# Patient Record
Sex: FEMALE | Race: ASIAN
[De-identification: names, ages, dates, MRNs, and addresses within clinical notes are randomized per-mention and may not be internally consistent; named-entity substitution may affect disease eponyms.]

---

## 2021-10-27 ENCOUNTER — NON-APPOINTMENT (OUTPATIENT)
Age: 41
End: 2021-10-27

## 2021-10-28 ENCOUNTER — APPOINTMENT (OUTPATIENT)
Dept: OBGYN | Facility: CLINIC | Age: 41
End: 2021-10-28
Payer: COMMERCIAL

## 2021-10-28 VITALS
BODY MASS INDEX: 20.77 KG/M2 | HEIGHT: 61 IN | DIASTOLIC BLOOD PRESSURE: 70 MMHG | SYSTOLIC BLOOD PRESSURE: 90 MMHG | WEIGHT: 110 LBS

## 2021-10-28 DIAGNOSIS — Z30.09 ENCOUNTER FOR OTHER GENERAL COUNSELING AND ADVICE ON CONTRACEPTION: ICD-10-CM

## 2021-10-28 DIAGNOSIS — N64.4 MASTODYNIA: ICD-10-CM

## 2021-10-28 PROBLEM — Z00.00 ENCOUNTER FOR PREVENTIVE HEALTH EXAMINATION: Status: ACTIVE | Noted: 2021-10-28

## 2021-10-28 LAB
HCG UR QL: NEGATIVE
QUALITY CONTROL: NORMAL

## 2021-10-28 PROCEDURE — 81025 URINE PREGNANCY TEST: CPT

## 2021-10-28 PROCEDURE — 99202 OFFICE O/P NEW SF 15 MIN: CPT | Mod: 25

## 2021-10-28 PROCEDURE — 99386 PREV VISIT NEW AGE 40-64: CPT

## 2021-10-28 NOTE — HISTORY OF PRESENT ILLNESS
[FreeTextEntry1] : MING MCKENZIE is a 41 year F   here for annual but also reports left breast pain x 2 weeks (has silicone implant in place for 15 years) and wants to discuss sterilization. Reports vaginal dryness - since forever, not using lubricant. Denies vaginal bleeding, discharge or pain.\par \par BC:condoms\par Gynhx: Reg menses, No h/o STIs/fibroids/cysts/abn paps\par Obhx: x2, top x1\par \par Last mammo:never\par Last Colonoscopy: never\par Last Pap smear:years\par Last dexa:not yet\par

## 2021-10-28 NOTE — DISCUSSION/SUMMARY
[FreeTextEntry1] : MING MCKENZIE is a 41 year  here for WWE, L breast > R breast, sterilization c/s\par - pap/hpv sent\par - SBE reviewed, will send for mammo and breast US, info for Dr. Krishna\par - Birth Control discussed - will try mirena before bilateral salpingectomy\par \par

## 2021-10-28 NOTE — PHYSICAL EXAM
[Chaperone Present] : A chaperone was present in the examining room during all aspects of the physical examination [Appropriately responsive] : appropriately responsive [Alert] : alert [No Acute Distress] : no acute distress [No Lymphadenopathy] : no lymphadenopathy [Regular Rate Rhythm] : regular rate rhythm [Soft] : soft [Non-tender] : non-tender [Non-distended] : non-distended [No HSM] : No HSM [No Lesions] : no lesions [No Mass] : no mass [Oriented x3] : oriented x3 [FreeTextEntry6] : left breast feels harder/mustafa than right [] : implants [Lt > Rt] : the left breast was larger than the right [No Masses] : no breast masses were palpable [Labia Majora] : normal [Labia Minora] : normal [Normal] : normal [Uterine Adnexae] : normal

## 2021-10-29 LAB
C TRACH RRNA SPEC QL NAA+PROBE: NOT DETECTED
HPV HIGH+LOW RISK DNA PNL CVX: NOT DETECTED
N GONORRHOEA RRNA SPEC QL NAA+PROBE: NOT DETECTED
SOURCE TP AMPLIFICATION: NORMAL

## 2021-11-03 LAB — CYTOLOGY CVX/VAG DOC THIN PREP: NORMAL

## 2021-11-10 ENCOUNTER — APPOINTMENT (OUTPATIENT)
Dept: OBGYN | Facility: CLINIC | Age: 41
End: 2021-11-10
Payer: COMMERCIAL

## 2021-11-10 VITALS
BODY MASS INDEX: 20.77 KG/M2 | WEIGHT: 110 LBS | HEIGHT: 61 IN | DIASTOLIC BLOOD PRESSURE: 70 MMHG | SYSTOLIC BLOOD PRESSURE: 112 MMHG

## 2021-11-10 DIAGNOSIS — L29.2 PRURITUS VULVAE: ICD-10-CM

## 2021-11-10 DIAGNOSIS — Z30.430 ENCOUNTER FOR INSERTION OF INTRAUTERINE CONTRACEPTIVE DEVICE: ICD-10-CM

## 2021-11-10 LAB
HCG UR QL: NEGATIVE
QUALITY CONTROL: NORMAL

## 2021-11-10 PROCEDURE — 99212 OFFICE O/P EST SF 10 MIN: CPT

## 2021-11-10 PROCEDURE — 58300 INSERT INTRAUTERINE DEVICE: CPT

## 2021-11-10 PROCEDURE — 81025 URINE PREGNANCY TEST: CPT

## 2021-11-10 RX ORDER — CLOTRIMAZOLE AND BETAMETHASONE DIPROPIONATE 10; .5 MG/G; MG/G
1-0.05 CREAM TOPICAL TWICE DAILY
Qty: 1 | Refills: 1 | Status: ACTIVE | COMMUNITY
Start: 2021-11-10 | End: 1900-01-01

## 2021-11-10 NOTE — PHYSICAL EXAM
[Chaperone Present] : A chaperone was present in the examining room during all aspects of the physical examination [Appropriately responsive] : appropriately responsive [Alert] : alert [No Acute Distress] : no acute distress [Soft] : soft [Non-tender] : non-tender [Oriented x3] : oriented x3 [Labia Majora] : normal [Labia Minora] : normal [Normal] : normal [Uterine Adnexae] : normal [Retroversion] : retroverted

## 2021-11-10 NOTE — HISTORY OF PRESENT ILLNESS
[FreeTextEntry1] : Patinet here for IUD mirena insertion. We had discussed last visit that IUD is almost comparable and less invasive than salpingectomy so we would try this first. Patient also had mammo and breast sono done for pain - all were normal. consent obtained. Patient reports vulvar itching from time to time\par pap nilm from 10/28/21\par hpv neg

## 2021-11-10 NOTE — PROCEDURE
[IUD Placement] : intrauterine device (IUD) placement [Time out performed] : Pre-procedure time out performed.  Patient's name, date of birth and procedure confirmed. [Consent Obtained] : Consent obtained [Risks] : risks [Benefits] : benefits [Alternatives] : alternatives [Patient] : patient [Infection] : infection [Bleeding] : bleeding [Pain] : pain [Expulsion] : expulsion [Failure] : failure [Uterine Perforation] : uterine perforation [Neg Pregnancy Test] : negative pregnancy test [Betadine] : Betadine [Tenaculum] : Tenaculum [Tolerated Well] : Patient tolerated the procedure well [No Complications] : No complications [Neg GC/Chlamydia] : negative GC/Chlamydia [No Premedication] : No premedication [Required Dilation] : required dilation [Sounded to ___ cm] : sounded to [unfilled] ~Ucm [Mirena IUD] : Mirena IUD [Motrin/Ibuprofen] : Motrin/Ibuprofen [Tylenol/Acetaminophen] : Tylenol/Acetaminophen [de-identified] : [id number] [de-identified] : 1/2024 [de-identified] : 11/10/2026 [de-identified] : as needed

## 2021-12-01 ENCOUNTER — APPOINTMENT (OUTPATIENT)
Dept: OBGYN | Facility: CLINIC | Age: 41
End: 2021-12-01
Payer: COMMERCIAL

## 2021-12-01 VITALS
WEIGHT: 115 LBS | HEIGHT: 61 IN | DIASTOLIC BLOOD PRESSURE: 78 MMHG | BODY MASS INDEX: 21.71 KG/M2 | SYSTOLIC BLOOD PRESSURE: 120 MMHG

## 2021-12-01 PROCEDURE — 76830 TRANSVAGINAL US NON-OB: CPT

## 2021-12-01 NOTE — PROCEDURE
[Locate IUD] : locate IUD [Transvaginal Ultrasound] : transvaginal ultrasound [FreeTextEntry3] : RV uterus, IUD in EE, tip at fundus, in good position, b/l normal ovaries, no FF

## 2021-12-20 ENCOUNTER — APPOINTMENT (OUTPATIENT)
Dept: MAMMOGRAPHY | Facility: CLINIC | Age: 41
End: 2021-12-20

## 2022-01-26 ENCOUNTER — APPOINTMENT (OUTPATIENT)
Dept: PLASTIC SURGERY | Facility: CLINIC | Age: 42
End: 2022-01-26

## 2023-01-03 ENCOUNTER — APPOINTMENT (OUTPATIENT)
Dept: OBGYN | Facility: CLINIC | Age: 43
End: 2023-01-03
Payer: COMMERCIAL

## 2023-01-03 DIAGNOSIS — N63.0 UNSPECIFIED LUMP IN UNSPECIFIED BREAST: ICD-10-CM

## 2023-01-03 DIAGNOSIS — N90.7 VULVAR CYST: ICD-10-CM

## 2023-01-03 DIAGNOSIS — Z97.5 PRESENCE OF (INTRAUTERINE) CONTRACEPTIVE DEVICE: ICD-10-CM

## 2023-01-03 DIAGNOSIS — R10.13 EPIGASTRIC PAIN: ICD-10-CM

## 2023-01-03 PROCEDURE — 99396 PREV VISIT EST AGE 40-64: CPT

## 2023-01-03 RX ORDER — LEVONORGESTREL 52 MG/1
20 INTRAUTERINE DEVICE INTRAUTERINE
Refills: 0 | Status: ACTIVE | COMMUNITY
Start: 2023-01-03

## 2023-01-03 NOTE — PLAN
[FreeTextEntry1] : Pap, HPV\par No treatment for labial cyst, pt advised to RTO if not resolved, keep area clean, no agitation, warm compresses prn\par GI referral given\par Pt to f/u with PCP as discussed, thyroid sono as ordered.\par Breast mammo and sono given.\par \par RTO in 1 year or sooner prn\par \par Amy Moody, CM

## 2023-01-03 NOTE — PHYSICAL EXAM
[No Lymphadenopathy] : no lymphadenopathy [Goiter] : goiter [Mass] : mass [Appropriately responsive] : appropriately responsive [Alert] : alert [No Acute Distress] : no acute distress [Regular Rate Rhythm] : regular rate rhythm [No Murmurs] : no murmurs [Clear to Auscultation B/L] : clear to auscultation bilaterally [Soft] : soft [Non-distended] : non-distended [No HSM] : No HSM [No Mass] : no mass [Oriented x3] : oriented x3 [Examination Of The Breasts] : a normal appearance [] : implants [No Discharge] : no discharge [No Masses] : no breast masses were palpable [FreeTextEntry3] : Left side, pt has thyroid sono with PCP ordered [FreeTextEntry6] : no breast masses, one axillary mass noted, .5 cm x .5 cm [FreeTextEntry7] : mild tenderness, epigastric area [Labia Majora] : normal [Labia Minora] : normal [No Bleeding] : There was no active vaginal bleeding [IUD String] : an IUD string was noted [Normal] : normal [Normal Position] : in a normal position [Tenderness] : nontender [Uterine Adnexae] : normal [FreeTextEntry1] : .5 cm x .5 cm, mild tenderness, superficial, no erythema, no drainage

## 2023-01-03 NOTE — HISTORY OF PRESENT ILLNESS
[Patient reported PAP Smear was normal] : Patient reported PAP Smear was normal [Rt Vulva] : right vulva [Localized] : localized [Warm Compress] : warm compress [Currently Active] : currently active [Men] : men [Vaginal] : vaginal [No] : No [FreeTextEntry1] : Pt reports lump on right vagina x 2 weeks or more.\par Tender to touch.\par \par IUD inserted 2021 [Mammogramdate] : 2021 [PapSmeardate] : 2021 [LMPDate] : IUD

## 2023-01-04 LAB — HPV HIGH+LOW RISK DNA PNL CVX: NOT DETECTED

## 2023-01-11 LAB — CYTOLOGY CVX/VAG DOC THIN PREP: ABNORMAL

## 2023-05-03 NOTE — DISCUSSION/SUMMARY
[FreeTextEntry1] : s/p mirena insertion, doing well\par for f/u sono in 2 weeks to confirm IUD position\par reassured patient about mammo and breast sono but advised her to still see Dr. Krishna for asymmetry with size and tone\par lotrisone sent
Declines

## 2024-01-29 PROBLEM — Z01.419 ENCOUNTER FOR ANNUAL ROUTINE GYNECOLOGICAL EXAMINATION: Status: ACTIVE | Noted: 2021-10-28

## 2024-01-30 ENCOUNTER — APPOINTMENT (OUTPATIENT)
Dept: OBGYN | Facility: CLINIC | Age: 44
End: 2024-01-30
Payer: COMMERCIAL

## 2024-01-30 VITALS
SYSTOLIC BLOOD PRESSURE: 113 MMHG | WEIGHT: 115 LBS | DIASTOLIC BLOOD PRESSURE: 70 MMHG | BODY MASS INDEX: 21.71 KG/M2 | HEIGHT: 61 IN

## 2024-01-30 DIAGNOSIS — Z01.419 ENCOUNTER FOR GYNECOLOGICAL EXAMINATION (GENERAL) (ROUTINE) W/OUT ABNORMAL FINDINGS: ICD-10-CM

## 2024-01-30 LAB
HCG UR QL: NEGATIVE
QUALITY CONTROL: YES

## 2024-01-30 PROCEDURE — 81025 URINE PREGNANCY TEST: CPT

## 2024-01-30 PROCEDURE — 99396 PREV VISIT EST AGE 40-64: CPT | Mod: 25

## 2024-01-30 NOTE — HISTORY OF PRESENT ILLNESS
[Patient reported mammogram was normal] : Patient reported mammogram was normal [Patient reported breast sonogram was normal] : Patient reported breast sonogram was normal [Patient reported PAP Smear was normal] : Patient reported PAP Smear was normal [N] : Patient reports normal menses [IUD] : has an intrauterine device [Y] : Positive pregnancy history [TextBox_4] : 44 yo  for well woman exam.  Mirena IUD in situ. Hx left axillary mass.  Hx partial thyroidectomy.  Aware of node on left and this has been evaluated by Endocrine and told not concerning.   [Mammogramdate] : 11/2021 [BreastSonogramDate] : 1/18/23 [PapSmeardate] : 1/2/23 [LMPDate] : Mirena [de-identified] : Mirena [PGHxTotal] : 2 [[facility] : 2 [[facility]Living] : 2

## 2024-01-30 NOTE — PHYSICAL EXAM
[Appropriately responsive] : appropriately responsive [Alert] : alert [No Acute Distress] : no acute distress [Thyroid Nodule] : thyroid nodule [Regular Rate Rhythm] : regular rate rhythm [No Murmurs] : no murmurs [Clear to Auscultation B/L] : clear to auscultation bilaterally [Soft] : soft [Non-tender] : non-tender [Non-distended] : non-distended [No Lesions] : no lesions [Oriented x3] : oriented x3 [FreeTextEntry3] : Left nodule - pt and MD aware [FreeTextEntry6] : bilateral implants [Examination Of The Breasts] : a normal appearance [No Masses] : no breast masses were palpable [Labia Majora] : normal [Labia Minora] : normal [Normal] : normal [Uterine Adnexae] : normal

## 2024-01-30 NOTE — PLAN
[FreeTextEntry1] : Unremarkable CBE and pelvic exam Mirena strings visualized SBE reviewed Pap and HPV collected Healthy diet, exercise, sleep hygiene discussed No other gyn concerns today RTO x 1 year or prn

## 2024-02-11 LAB
CYTOLOGY CVX/VAG DOC THIN PREP: NORMAL
HPV HIGH+LOW RISK DNA PNL CVX: NOT DETECTED

## 2025-02-11 ENCOUNTER — APPOINTMENT (OUTPATIENT)
Dept: OBGYN | Facility: CLINIC | Age: 45
End: 2025-02-11
Payer: COMMERCIAL

## 2025-02-11 VITALS
DIASTOLIC BLOOD PRESSURE: 82 MMHG | BODY MASS INDEX: 21.71 KG/M2 | WEIGHT: 115 LBS | SYSTOLIC BLOOD PRESSURE: 124 MMHG | HEIGHT: 61 IN

## 2025-02-11 DIAGNOSIS — Z01.419 ENCOUNTER FOR GYNECOLOGICAL EXAMINATION (GENERAL) (ROUTINE) W/OUT ABNORMAL FINDINGS: ICD-10-CM

## 2025-02-11 DIAGNOSIS — L29.2 PRURITUS VULVAE: ICD-10-CM

## 2025-02-11 PROCEDURE — 99386 PREV VISIT NEW AGE 40-64: CPT

## 2025-02-11 RX ORDER — CLOTRIMAZOLE AND BETAMETHASONE DIPROPIONATE 10; .5 MG/G; MG/G
1-0.05 CREAM TOPICAL TWICE DAILY
Qty: 1 | Refills: 1 | Status: ACTIVE | COMMUNITY
Start: 2025-02-11 | End: 1900-01-01

## 2025-02-12 LAB — HPV HIGH+LOW RISK DNA PNL CVX: NOT DETECTED

## 2025-02-16 LAB — CYTOLOGY CVX/VAG DOC THIN PREP: NORMAL
